# Patient Record
Sex: FEMALE | Employment: FULL TIME
[De-identification: names, ages, dates, MRNs, and addresses within clinical notes are randomized per-mention and may not be internally consistent; named-entity substitution may affect disease eponyms.]

---

## 2017-09-10 ENCOUNTER — HEALTH MAINTENANCE LETTER (OUTPATIENT)
Age: 47
End: 2017-09-10

## 2019-11-08 ENCOUNTER — HEALTH MAINTENANCE LETTER (OUTPATIENT)
Age: 49
End: 2019-11-08

## 2020-02-23 ENCOUNTER — HEALTH MAINTENANCE LETTER (OUTPATIENT)
Age: 50
End: 2020-02-23

## 2020-09-04 ENCOUNTER — THERAPY VISIT (OUTPATIENT)
Dept: PHYSICAL THERAPY | Facility: CLINIC | Age: 50
End: 2020-09-04
Payer: COMMERCIAL

## 2020-09-04 DIAGNOSIS — M25.571 PAIN IN JOINT INVOLVING ANKLE AND FOOT, RIGHT: ICD-10-CM

## 2020-09-04 DIAGNOSIS — M25.371 ANKLE INSTABILITY, RIGHT: ICD-10-CM

## 2020-09-04 PROCEDURE — 97110 THERAPEUTIC EXERCISES: CPT | Mod: GP | Performed by: PHYSICAL THERAPIST

## 2020-09-04 PROCEDURE — 97161 PT EVAL LOW COMPLEX 20 MIN: CPT | Mod: GP | Performed by: PHYSICAL THERAPIST

## 2020-09-04 NOTE — PROGRESS NOTES
"[facility] Initial Evaluation  Subjective:  The history is provided by the patient. No  was used.   Patient Health History  Dari Sanchez being seen for R ankle pain/instability.     Date of Onset: 8/18/20 MD order for PT.   Problem occurred: reports injurying her right ankle when missing a step at home. She reports fracturing her \"mid tarsals and spraining ligaments\" on 5/18/20. She used a boot for 12 weeks , off of boot for 2 weeks. She c/o a sharp pain in the inside and outside of her foot when walking longer distances however it is much improved since the injury.   Pain is reported as 3/10 on pain scale.  General health as reported by patient is excellent.  Pertinent medical history includes: osteoarthritis.   Red flags:  None as reported by patient.  Medical allergies: latex.   Surgeries include:  Other and orthopedic surgery (gall bladder, bunionectomy B 2012,2010, R knee scope 2008 ).    Current medications:  Anti-inflammatory.    Current occupation is  inventory .   Primary job tasks include:  Computer work.                  Therapist Generated HPI Evaluation         Type of problem:  Left ankle and left foot.    This is a new condition.  Condition occurred with:  A fall/slip.    Patient reports pain:  Lateral and medial.        Associated symptoms:  Loss of motion/stiffness. Symptoms are exacerbated by walking, ascending stairs and descending stairs  and relieved by ice and NSAID's.                              Objective:    Gait:    Gait Type:  Antalgic   Assistive Devices:  None  Deviations:  Ankle:  Push off decr R and heel strike decr RGeneral Deviations:  Marry decr    Flexibility/Screens:       Lower Extremity:      Decreased right lower extremity flexibility:  Gastroc          Ankle/Foot Evaluation  ROM:    AROM:    Dorsiflexion:  Left:   8  Right:   5  Plantarflexion:  Left:  64    Right:  54  Inversion:  Left:  30     Right:  " 12  Eversion:  15     Right:  10      PROM:    Dorsiflexion:  Left:    10     Right:   8   Plantarflexion:  Left:    65     Right:  58  Inversion:  Left:      30     Right:   20  Eversion: Left:   15     Right:  14          Strength is not assessed.      PALPATION: normal    EDEMA:     Right ankle edema present at:  lateral; anterior and forefoot                                                            General     ROS    Assessment/Plan:    Patient is a 49 year old female with right side ankle complaints.    Patient has the following significant findings with corresponding treatment plan.                Diagnosis 1: ankle instability  Pain -  hot/cold therapy, manual therapy, self management, education and home program  Decreased ROM/flexibility - manual therapy, therapeutic exercise and home program  Decreased joint mobility - manual therapy, therapeutic exercise and home program  Decreased strength - therapeutic exercise, therapeutic activities and home program  Decreased proprioception - neuro re-education, therapeutic activities and home program  Impaired gait - gait training and home program  Impaired muscle performance - neuro re-education and home program  Decreased function - therapeutic activities and home program    Therapy Evaluation Codes:   1) History comprised of:   Personal factors that impact the plan of care:      None.    Comorbidity factors that impact the plan of care are:      None.     Medications impacting care: None.  2) Examination of Body Systems comprised of:   Body structures and functions that impact the plan of care:      Ankle.   Activity limitations that impact the plan of care are:      Jumping, Running, Sports, Stairs, Standing and Walking.  3) Clinical presentation characteristics are:   Stable/Uncomplicated.  4) Decision-Making    Low complexity using standardized patient assessment instrument and/or measureable assessment of functional outcome.  Cumulative Therapy Evaluation  is: Low complexity.    Previous and current functional limitations:  (See Goal Flow Sheet for this information)    Short term and Long term goals: (See Goal Flow Sheet for this information)     Communication ability:  Patient appears to be able to clearly communicate and understand verbal and written communication and follow directions correctly.  Treatment Explanation - The following has been discussed with the patient:   RX ordered/plan of care  Anticipated outcomes  Possible risks and side effects  This patient would benefit from PT intervention to resume normal activities.   Rehab potential is good.    Frequency:  1 X week, once daily  Duration:  for 8 weeks  Discharge Plan:  Achieve all LTG.  Independent in home treatment program.  Reach maximal therapeutic benefit.    Please refer to the daily flowsheet for treatment today, total treatment time and time spent performing 1:1 timed codes.

## 2020-09-04 NOTE — LETTER
"[facility] PHYSICAL THERAPY  [address] [address]  [facility] [place] [id number] [phone]    2020    Re: Dari Sanchez   :   1970  MRN:  [id number]   REFERRING PHYSICIAN:   Ankit Shaffer    [facility]    Date of Initial Evaluation:  2020  Visits:  Rxs Used: 1  Reason for Referral:     Pain in joint involving ankle and foot, right  Ankle instability, right    EVALUATION SUMMARY    [facility]  Subjective:  The history is provided by the patient. No  was used.     Patient Health History  Dari Sanchez being seen for R ankle pain/instability.   Date of Onset: 20 MD order for PT.   Problem occurred: reports injurying her right ankle when missing a step at home. She reports fracturing her \"mid tarsals and spraining ligaments\" on 20. She used a boot for 12 weeks , off of boot for 2 weeks. She c/o a sharp pain in the inside and outside of her foot when walking longer distances however it is much improved since the injury.   Pain is reported as 3/10 on pain scale.  General health as reported by patient is excellent.  Pertinent medical history includes: osteoarthritis.   Red flags:  None as reported by patient.  Medical allergies: latex.   Surgeries include:  Other and orthopedic surgery (gall bladder, bunionectomy B ,, R knee scope  ).    Current medications:  Anti-inflammatory.    Current occupation is senior director inventory .   Primary job tasks include:  Computer work.                Therapist Generated HPI Evaluation  Type of problem:  Left ankle and left foot.  This is a new condition.  Condition occurred with:  A fall/slip.  Patient reports pain:  Lateral and medial.  Associated symptoms:  Loss of motion/stiffness. Symptoms are exacerbated by walking, ascending stairs and descending stairs  and relieved by ice " and NSAID's.        Dariarun Woodallen   :   1970    Objective:    Gait:    Gait Type:  Antalgic   Assistive Devices:  None  Deviations:  Ankle:  Push off decr R and heel strike decr RGeneral Deviations:  Marry decr    Flexibility/Screens:     Lower Extremity:    Decreased right lower extremity flexibility:  Gastroc    Ankle/Foot Evaluation  ROM:    AROM:    Dorsiflexion:  Left:   8  Right:   5  Plantarflexion:  Left:  64    Right:  54  Inversion:  Left:  30     Right:  12  Eversion:  15     Right:  10    PROM:    Dorsiflexion:  Left:    10     Right:   8   Plantarflexion:  Left:    65     Right:  58  Inversion:  Left:      30     Right:   20  Eversion: Left:   15     Right:  14  Strength is not assessed.    PALPATION: normal    EDEMA:     Right ankle edema present at:  lateral; anterior and forefoot    General     ROS    Assessment/Plan:    Patient is a 49 year old female with right side ankle complaints.    Patient has the following significant findings with corresponding treatment plan.                Diagnosis 1: ankle instability  Pain -  hot/cold therapy, manual therapy, self management, education and home program  Decreased ROM/flexibility - manual therapy, therapeutic exercise and home program  Decreased joint mobility - manual therapy, therapeutic exercise and home program  Decreased strength - therapeutic exercise, therapeutic activities and home program  Decreased proprioception - neuro re-education, therapeutic activities and home program  Impaired gait - gait training and home program  Impaired muscle performance - neuro re-education and home program  Decreased function - therapeutic activities and home program    Dari Sanchez   :   1970    Therapy Evaluation Codes:   1) History comprised of:   Personal factors that impact the plan of care:      None.    Comorbidity factors that impact the plan of care are:      None.     Medications impacting care: None.  2) Examination of Body  Systems comprised of:   Body structures and functions that impact the plan of care:      Ankle.   Activity limitations that impact the plan of care are:      Jumping, Running, Sports, Stairs, Standing and Walking.  3) Clinical presentation characteristics are:   Stable/Uncomplicated.  4) Decision-Making    Low complexity using standardized patient assessment instrument and/or measureable assessment of functional outcome.  Cumulative Therapy Evaluation is: Low complexity.    Previous and current functional limitations:  (See Goal Flow Sheet for this information)    Short term and Long term goals: (See Goal Flow Sheet for this information)   Communication ability:  Patient appears to be able to clearly communicate and understand verbal and written communication and follow directions correctly.  Treatment Explanation - The following has been discussed with the patient:   RX ordered/plan of care  Anticipated outcomes  Possible risks and side effects  This patient would benefit from PT intervention to resume normal activities.   Rehab potential is good.  Frequency:  1 X week, once daily  Duration:  for 8 weeks  Discharge Plan:  Achieve all LTG.  Independent in home treatment program.  Reach maximal therapeutic benefit.    Thank you for your referral.    INQUIRIES  Therapist: Yue Burch, PT  [facility] PHYSICAL THERAPY  [address] [place] [address]. [address]  [facility] [address]  Phone: [phone]  Fax: [phone]

## 2020-09-11 ENCOUNTER — THERAPY VISIT (OUTPATIENT)
Dept: PHYSICAL THERAPY | Facility: CLINIC | Age: 50
End: 2020-09-11
Payer: COMMERCIAL

## 2020-09-11 DIAGNOSIS — M25.371 ANKLE INSTABILITY, RIGHT: ICD-10-CM

## 2020-09-11 DIAGNOSIS — M25.571 PAIN IN JOINT INVOLVING ANKLE AND FOOT, RIGHT: ICD-10-CM

## 2020-09-11 PROCEDURE — 97110 THERAPEUTIC EXERCISES: CPT | Mod: GP | Performed by: PHYSICAL THERAPIST

## 2020-09-11 PROCEDURE — 97140 MANUAL THERAPY 1/> REGIONS: CPT | Mod: GP | Performed by: PHYSICAL THERAPIST

## 2020-09-25 ENCOUNTER — THERAPY VISIT (OUTPATIENT)
Dept: PHYSICAL THERAPY | Facility: CLINIC | Age: 50
End: 2020-09-25
Payer: COMMERCIAL

## 2020-09-25 DIAGNOSIS — M54.12 CERVICAL RADICULOPATHY: ICD-10-CM

## 2020-09-25 PROCEDURE — 97161 PT EVAL LOW COMPLEX 20 MIN: CPT | Mod: GP | Performed by: PHYSICAL THERAPIST

## 2020-09-25 PROCEDURE — 97110 THERAPEUTIC EXERCISES: CPT | Mod: GP | Performed by: PHYSICAL THERAPIST

## 2020-09-25 NOTE — PROGRESS NOTES
[facility] Initial Evaluation  Subjective:  Sera reports that she slept wrong for a night (about 4 weeks ago) and she woke up with a very stiff neck and feels pain low on the R side of the neck at base of shoulder. Working out with TRX 3 days in a row after her initial pain made things worse and she has now stopped that.  Reaching low with arm extended creates numbness in R arm (tying shoes, etc) and raising arm overhead will provide relief.  Lengthy computer work produces increased R upper arm, elbow, forearm and hand symptoms as well.      The history is provided by the patient. No  was used.   Therapist Generated HPI Evaluation         Type of problem:  Cervical spine.    This is a new condition.  Occurance: wrong sleeping position.  Where condition occurred: at home.  Patient reports pain:  Lower cervical spine and cervical right side.  Pain is described as shooting and aching (tingling) and is constant.  Pain radiates to:  Shoulder right, upper arm right, elbow right, lower arm right and hand right. Pain timing: activity-dependent.  Since onset symptoms are unchanged.  Associated symptoms:  Loss of motion/stiffness, numbness, tingling and headache (2 HA/week). Symptoms are exacerbated by driving, looking up or down, rotating head, lifting, carrying, certain positions and sitting (computer work)  and relieved by NSAID's and ice (raise arm overhead).      Restrictions due to condition include:  Working in normal job without restrictions.  Barriers include:  None as reported by patient.    Patient Health History  Dari Sanchez being seen for neck and R arm pain.     Date of Onset: 1 month ago.   Problem occurred: sleeping   Pain is reported as 5/10 on pain scale.  General health as reported by patient is excellent.  Pertinent medical history includes: osteoarthritis.   Red flags:  None as reported by patient.  Medical allergies: latex.   Surgeries include:  Orthopedic  surgery (gall bladder, B bunionectomy, R knee scope).    Current medications:  Anti-inflammatory.    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   Primary job tasks include:  Computer work and prolonged sitting.                                    Objective:  Standing Alignment:    Cervical/Thoracic:  Forward head and thoracic kyphosis increased  Shoulder/UE:  Rounded shoulders                                            ROM:          :  Dominance: Right   Left: 25 lbs      Right: 29 lbs                                      Flex Cervical Evaluation    Posture:  Sitting: poor  Standing: poor  Protruding Head: yes  Wry Neck: no  Correction of Posture: no effect    Movement Loss:  Protrusion (PRO): nil  Flexion (Flex): pain and min  Retraction (RET): min and pain  Extension (EXT): min and pain  Lateral Flexion Right (LF R): min and pain  Lateral Flexion Left (LF L): nil  Rotation Right (ROT R): min and pain  Rotation Left (ROT L): nil  Test Movements:      RET: During: increases  After: no worse  Mechanical Response: no effect  Repeat RET: During: no effect  After: no effect  Mechanical Response: no effect  RET EXT: During: increases and centralizing  After: no effect  Mechanical Response: no effect  Repeat RET EXT: During: increases and centralizing  After: no worse  Mechanical Response: IncROM                        Conclusion: derangement  Principle of Treatment:  Posture Correction: posture correction in sitting, use of lumbar roll for computer work    Extension: rep ret/ext every 2-3 hours, reduce to rep ret with self OP only if R arm remains worse from ret/ext    Other: pt education regarding spine postures, rationale for posture correction, chosen HEP and expectations for changes in pain (centralization vs peripheralization), answered pt questions                                     Musculoskeletal:        Arms:        ROS    Assessment/Plan:    Patient is a 50 year old female with  cervical and right arm complaints.    Patient has the following significant findings with corresponding treatment plan.                Diagnosis 1:  R cervical radiculopathy  Pain -  hot/cold therapy, manual therapy, self management, education, directional preference exercise and home program  Decreased ROM/flexibility - manual therapy, therapeutic exercise and home program  Decreased joint mobility - manual therapy, therapeutic exercise and home program  Impaired muscle performance - neuro re-education and home program  Decreased function - therapeutic activities and home program  Impaired posture - neuro re-education and home program    Therapy Evaluation Codes:   1) History comprised of:   Personal factors that impact the plan of care:      None.    Comorbidity factors that impact the plan of care are:      None.     Medications impacting care: None.  2) Examination of Body Systems comprised of:   Body structures and functions that impact the plan of care:      Cervical spine.   Activity limitations that impact the plan of care are:      Bathing, Driving, Lifting, Reading/Computer work, Sitting, Working, Sleeping, Laying down and reaching.  3) Clinical presentation characteristics are:   Stable/Uncomplicated.  4) Decision-Making    Low complexity using standardized patient assessment instrument and/or measureable assessment of functional outcome.  Cumulative Therapy Evaluation is: Low complexity.    Previous and current functional limitations:  (See Goal Flow Sheet for this information)    Short term and Long term goals: (See Goal Flow Sheet for this information)     Communication ability:  Patient appears to be able to clearly communicate and understand verbal and written communication and follow directions correctly.  Treatment Explanation - The following has been discussed with the patient:   RX ordered/plan of care  Anticipated outcomes  Possible risks and side effects  This patient would benefit from PT  intervention to resume normal activities.   Rehab potential is good.    Frequency:  1 X week, once daily  Duration:  for 8 weeks  Discharge Plan:  Achieve all LTG.  Independent in home treatment program.  Reach maximal therapeutic benefit.    Please refer to the daily flowsheet for treatment today, total treatment time and time spent performing 1:1 timed codes.

## 2020-10-02 ENCOUNTER — THERAPY VISIT (OUTPATIENT)
Dept: PHYSICAL THERAPY | Facility: CLINIC | Age: 50
End: 2020-10-02
Payer: COMMERCIAL

## 2020-10-02 DIAGNOSIS — M25.371 ANKLE INSTABILITY, RIGHT: ICD-10-CM

## 2020-10-02 DIAGNOSIS — M54.12 CERVICAL RADICULOPATHY: ICD-10-CM

## 2020-10-02 DIAGNOSIS — M25.571 PAIN IN JOINT INVOLVING ANKLE AND FOOT, RIGHT: ICD-10-CM

## 2020-10-02 PROCEDURE — 97035 APP MDLTY 1+ULTRASOUND EA 15: CPT | Mod: GP | Performed by: PHYSICAL THERAPIST

## 2020-10-02 PROCEDURE — 97110 THERAPEUTIC EXERCISES: CPT | Mod: GP | Performed by: PHYSICAL THERAPIST

## 2020-10-02 PROCEDURE — 97140 MANUAL THERAPY 1/> REGIONS: CPT | Mod: GP | Performed by: PHYSICAL THERAPIST

## 2020-10-09 ENCOUNTER — THERAPY VISIT (OUTPATIENT)
Dept: PHYSICAL THERAPY | Facility: CLINIC | Age: 50
End: 2020-10-09
Payer: COMMERCIAL

## 2020-10-09 DIAGNOSIS — M54.12 CERVICAL RADICULOPATHY: ICD-10-CM

## 2020-10-09 PROCEDURE — 97140 MANUAL THERAPY 1/> REGIONS: CPT | Mod: GP | Performed by: PHYSICAL THERAPIST

## 2020-10-09 PROCEDURE — 97110 THERAPEUTIC EXERCISES: CPT | Mod: GP | Performed by: PHYSICAL THERAPIST

## 2020-10-30 ENCOUNTER — THERAPY VISIT (OUTPATIENT)
Dept: PHYSICAL THERAPY | Facility: CLINIC | Age: 50
End: 2020-10-30
Payer: COMMERCIAL

## 2020-10-30 DIAGNOSIS — M25.571 PAIN IN JOINT INVOLVING ANKLE AND FOOT, RIGHT: ICD-10-CM

## 2020-10-30 DIAGNOSIS — M25.371 ANKLE INSTABILITY, RIGHT: ICD-10-CM

## 2020-10-30 DIAGNOSIS — M54.12 CERVICAL RADICULOPATHY: ICD-10-CM

## 2020-10-30 PROCEDURE — 97530 THERAPEUTIC ACTIVITIES: CPT | Mod: GP | Performed by: PHYSICAL THERAPIST

## 2020-10-30 PROCEDURE — 97140 MANUAL THERAPY 1/> REGIONS: CPT | Mod: GP | Performed by: PHYSICAL THERAPIST

## 2020-10-30 PROCEDURE — 97012 MECHANICAL TRACTION THERAPY: CPT | Mod: GP | Performed by: PHYSICAL THERAPIST

## 2020-10-30 PROCEDURE — 97110 THERAPEUTIC EXERCISES: CPT | Mod: GP | Performed by: PHYSICAL THERAPIST

## 2020-11-06 ENCOUNTER — THERAPY VISIT (OUTPATIENT)
Dept: PHYSICAL THERAPY | Facility: CLINIC | Age: 50
End: 2020-11-06
Payer: COMMERCIAL

## 2020-11-06 DIAGNOSIS — M54.12 CERVICAL RADICULOPATHY: ICD-10-CM

## 2020-11-06 PROCEDURE — 97110 THERAPEUTIC EXERCISES: CPT | Mod: GP | Performed by: PHYSICAL THERAPIST

## 2020-11-06 PROCEDURE — 97530 THERAPEUTIC ACTIVITIES: CPT | Mod: GP | Performed by: PHYSICAL THERAPIST

## 2020-11-06 PROCEDURE — 97012 MECHANICAL TRACTION THERAPY: CPT | Mod: GP | Performed by: PHYSICAL THERAPIST

## 2020-11-06 NOTE — PROGRESS NOTES
Subjective:  HPI  Physical Exam                    Objective:  System    Physical Exam    General     ROS    Assessment/Plan:    SUBJECTIVE  Subjective changes as noted by pt:  Sera reports that she has been miserable the last few days. Cannot get comfortable to sleep. The R elbow is quite sore.        Current pain level: 7/10     Changes in function:  None     Adverse reaction to treatment or activity:  None    OBJECTIVE  Changes in objective findings:  Yes, See physical exam section and/or daily flowsheet for response to repeated movements.           ASSESSMENT  Dari continues to require intervention to meet STG and LTG's: PT  No change of symptoms has been noted.  Response to therapy has shown lack of progress in  pain level  Progress made towards STG/LTG?  Yes (See Goal flowsheet attached for updates on achievement of STG and LTG)    PLAN  Continue current treatment plan until patient demonstrates readiness to progress to higher level exercises.    PTA/ATC plan:  N/A    Please refer to the daily flowsheet for treatment today, total treatment time and time spent performing 1:1 timed codes.

## 2020-11-13 ENCOUNTER — THERAPY VISIT (OUTPATIENT)
Dept: PHYSICAL THERAPY | Facility: CLINIC | Age: 50
End: 2020-11-13
Payer: COMMERCIAL

## 2020-11-13 DIAGNOSIS — M54.12 CERVICAL RADICULOPATHY: ICD-10-CM

## 2020-11-13 PROCEDURE — 97140 MANUAL THERAPY 1/> REGIONS: CPT | Mod: GP | Performed by: PHYSICAL THERAPIST

## 2020-11-13 PROCEDURE — 97012 MECHANICAL TRACTION THERAPY: CPT | Mod: GP | Performed by: PHYSICAL THERAPIST

## 2020-11-20 ENCOUNTER — THERAPY VISIT (OUTPATIENT)
Dept: PHYSICAL THERAPY | Facility: CLINIC | Age: 50
End: 2020-11-20
Payer: COMMERCIAL

## 2020-11-20 DIAGNOSIS — M54.12 CERVICAL RADICULOPATHY: ICD-10-CM

## 2020-11-20 PROCEDURE — 97012 MECHANICAL TRACTION THERAPY: CPT | Mod: GP

## 2020-11-20 PROCEDURE — 97530 THERAPEUTIC ACTIVITIES: CPT | Mod: GP

## 2020-11-27 ENCOUNTER — THERAPY VISIT (OUTPATIENT)
Dept: PHYSICAL THERAPY | Facility: CLINIC | Age: 50
End: 2020-11-27
Payer: COMMERCIAL

## 2020-11-27 DIAGNOSIS — M54.12 CERVICAL RADICULOPATHY: ICD-10-CM

## 2020-11-27 PROCEDURE — 97140 MANUAL THERAPY 1/> REGIONS: CPT | Mod: GP | Performed by: PHYSICAL THERAPIST

## 2020-11-27 PROCEDURE — 97012 MECHANICAL TRACTION THERAPY: CPT | Mod: GP | Performed by: PHYSICAL THERAPIST

## 2020-11-27 NOTE — PROGRESS NOTES
Subjective:  HPI  Physical Exam                    Objective:  System    Physical Exam    General     ROS    Assessment/Plan:    PROGRESS  REPORT    Progress reporting period is from 9/25/20 to 11/27/20.       SUBJECTIVE  Subjective: Sera reports that she is definitely getting better., but it still goes numb from time to time, mariela with reaching down.  Has only 2 bad days/week instead of 7 bad days.  Still babies it because she can tell its still there.  Sleeping with pillow rolled under her neck and lying on her back is best.  Would like to continue PT. Wants to purchase a lumbar roll and cervical pillow.     Current pain level is 2/10  .     Initial Pain level: 4/10 R UT, 5/10 R elbow.   Changes in function:  Yes (See Goal flowsheet attached for changes in current functional level)  Adverse reaction to treatment or activity: None    OBJECTIVE  Changes noted in objective findings:  Yes, please see below.   Objective: CROM: flex min loss, ext mod loss + R neck pain, ret mod loss + pinch (ok when donw with felexion bias), B SB min-mod loss, L rot full, R rot min loss.  R arm numbness with extension and retraction in neutral.  No loss  strength.  At this time, Sera is geting relief between session through posture corrections, retraction movements with flexion bias and scapular strengthening.  She continues to require PT for progressive pain relief, reducation of radicaulr symptoms and return to previous level of function.       ASSESSMENT/PLAN  Updated problem list and treatment plan: Diagnosis 1:  R cervical radiculopathy  Pain -  hot/cold therapy, mechanical traction, manual therapy, self management, education, directional preference exercise and home program  Decreased ROM/flexibility - manual therapy, therapeutic exercise and home program  Decreased joint mobility - manual therapy, therapeutic exercise and home program  Impaired muscle performance - neuro re-education and home program  Decreased function -  therapeutic activities and home program  Impaired posture - neuro re-education and home program  STG/LTGs have been met or progress has been made towards goals:  Yes (See Goal flow sheet completed today.)  Assessment of Progress: The patient's condition is improving.  Patient is meeting short term goals and is progressing towards long term goals.  Self Management Plans:  Patient has been instructed in a home treatment program.  Patient  has been instructed in self management of symptoms.  I have re-evaluated this patient and find that the nature, scope, duration and intensity of the therapy is appropriate for the medical condition of the patient.  Dari continues to require the following intervention to meet STG and LTG's:  PT    Recommendations:  This patient would benefit from continued therapy.     Frequency:  1-2 X week, once daily  Duration:  for 8 visits        Please refer to the daily flowsheet for treatment today, total treatment time and time spent performing 1:1 timed codes.

## 2020-12-03 ENCOUNTER — THERAPY VISIT (OUTPATIENT)
Dept: PHYSICAL THERAPY | Facility: CLINIC | Age: 50
End: 2020-12-03
Payer: COMMERCIAL

## 2020-12-03 DIAGNOSIS — M54.12 CERVICAL RADICULOPATHY: ICD-10-CM

## 2020-12-03 PROCEDURE — 97012 MECHANICAL TRACTION THERAPY: CPT | Mod: GP | Performed by: PHYSICAL THERAPIST

## 2020-12-03 PROCEDURE — 97140 MANUAL THERAPY 1/> REGIONS: CPT | Mod: GP | Performed by: PHYSICAL THERAPIST

## 2020-12-03 PROCEDURE — 97110 THERAPEUTIC EXERCISES: CPT | Mod: GP | Performed by: PHYSICAL THERAPIST

## 2020-12-03 NOTE — PROGRESS NOTES
Subjective:  HPI  Physical Exam                    Objective:  System    Physical Exam    General     ROS    Assessment/Plan:    SUBJECTIVE  Subjective changes as noted by pt:  Getting better. Fewer days during the week where the pain is present, but not throbbing, more achey. Notices R arm numbness with certain activities. Overall about 50% improved.        Current pain level: 3/10     Changes in function:  Yes (See Goal flowsheet attached for changes in current functional level)     Adverse reaction to treatment or activity:  None    OBJECTIVE  Changes in objective findings:  Yes, See physical exam section and/or daily flowsheet for response to repeated movements.           ASSESSMENT  Dari continues to require intervention to meet STG and LTG's: PT  Patient's symptoms are resolving.  Patient is progressing as expected.  Response to therapy has shown an improvement in  pain level, ROM  and function  Progress made towards STG/LTG?  Yes (See Goal flowsheet attached for updates on achievement of STG and LTG)    PLAN  Continue current treatment plan until patient demonstrates readiness to progress to higher level exercises.    PTA/ATC plan:  N/A    Please refer to the daily flowsheet for treatment today, total treatment time and time spent performing 1:1 timed codes.

## 2020-12-06 ENCOUNTER — HEALTH MAINTENANCE LETTER (OUTPATIENT)
Age: 50
End: 2020-12-06

## 2021-01-08 ENCOUNTER — THERAPY VISIT (OUTPATIENT)
Dept: PHYSICAL THERAPY | Facility: CLINIC | Age: 51
End: 2021-01-08
Payer: COMMERCIAL

## 2021-01-08 DIAGNOSIS — M54.12 CERVICAL RADICULOPATHY: ICD-10-CM

## 2021-01-08 PROCEDURE — 97110 THERAPEUTIC EXERCISES: CPT | Mod: GP | Performed by: PHYSICAL THERAPIST

## 2021-01-08 PROCEDURE — 97140 MANUAL THERAPY 1/> REGIONS: CPT | Mod: GP | Performed by: PHYSICAL THERAPIST

## 2021-01-08 PROCEDURE — 97530 THERAPEUTIC ACTIVITIES: CPT | Mod: GP | Performed by: PHYSICAL THERAPIST

## 2021-01-08 NOTE — PROGRESS NOTES
Subjective:  HPI  Physical Exam                    Objective:  System              Cervical/Thoracic Evaluation              Cervical Palpation:      Tenderness present at Right:    Upper Trap                                                  Flex Cervical Evaluation      Movement Loss:    Flexion (Flex): nil  Retraction (RET): nil  Extension (EXT): min  Lateral Flexion Right (LF R): nil  Lateral Flexion Left (LF L): nil  Rotation Right (ROT R): nil  Rotation Left (ROT L): nil                                                 ROS    Assessment/Plan:    PROGRESS  REPORT    Progress reporting period is from 9/25/20 to 1/8/21.       SUBJECTIVE  Subjective changes noted by patient:  Sera returns to PT after a month. Had to miss a handful appointments, including due to weather. Had gotten to the point that she felt like she was almost normal. Flew to [place] and back this last weekend and notes return to the R scapular pain and soreness in the R elbow. Feels it may be related to posture while at the airport. Demonstrates that she has been performing cervical retraction, rotation and sidebending 10 reps 4x/day. Started reducing Gabapentin.          Current pain level is 2/10  .     Previous pain level was  5/10  .   Changes in function:  Yes (See Goal flowsheet attached for changes in current functional level)  Adverse reaction to treatment or activity: None    OBJECTIVE  Changes noted in objective findings:  Yes, See physical exam section and/or daily flowsheet for response to repeated movements.           ASSESSMENT/PLAN  Updated problem list and treatment plan: Diagnosis 1:  R cervical radiculopathy    Pain -  manual therapy, self management, education, directional preference exercise and home program  Decreased ROM/flexibility - manual therapy, therapeutic exercise, therapeutic activity and home program  Decreased joint mobility - manual therapy, therapeutic exercise, therapeutic activity and home program  Decreased  strength - therapeutic exercise, therapeutic activities and home program  Inflammation - self management/home program  Decreased function - therapeutic activities and home program  Impaired posture - neuro re-education, therapeutic activities and home program  STG/LTGs have been met or progress has been made towards goals:  Yes (See Goal flow sheet completed today.)  Assessment of Progress: The patient's condition has exacerbated.  Self Management Plans:  Patient has been instructed in a home treatment program.  Patient  has been instructed in self management of symptoms.  I have re-evaluated this patient and find that the nature, scope, duration and intensity of the therapy is appropriate for the medical condition of the patient.  Dari continues to require the following intervention to meet STG and LTG's:  PT    Recommendations:  This patient would benefit from continued therapy.     Frequency:  1 X week, once daily  Duration:  for 3 weeks        Please refer to the daily flowsheet for treatment today, total treatment time and time spent performing 1:1 timed codes.

## 2021-01-21 PROBLEM — M54.12 CERVICAL RADICULOPATHY: Status: RESOLVED | Noted: 2020-09-25 | Resolved: 2021-01-21

## 2021-01-21 NOTE — PROGRESS NOTES
Patient missed last scheduled appt on 1/20/21. Spoke on phone, Sera had forgottent about the appt but stated that she was feeling good and managing symptoms. Discussed plan for full reduction and maintenance timeframe. Call or return to clinic with any flareups.   Please refer to the progress note and goal flowsheet completed on 01/08/21 for discharge information.

## 2021-02-20 ENCOUNTER — HEALTH MAINTENANCE LETTER (OUTPATIENT)
Age: 51
End: 2021-02-20

## 2021-07-25 PROBLEM — M25.371 ANKLE INSTABILITY, RIGHT: Status: RESOLVED | Noted: 2020-09-04 | Resolved: 2021-07-25

## 2021-07-25 PROBLEM — M25.571 PAIN IN JOINT INVOLVING ANKLE AND FOOT, RIGHT: Status: RESOLVED | Noted: 2020-09-04 | Resolved: 2021-07-25

## 2021-09-25 ENCOUNTER — HEALTH MAINTENANCE LETTER (OUTPATIENT)
Age: 51
End: 2021-09-25

## 2022-01-15 ENCOUNTER — HEALTH MAINTENANCE LETTER (OUTPATIENT)
Age: 52
End: 2022-01-15

## 2022-03-12 ENCOUNTER — HEALTH MAINTENANCE LETTER (OUTPATIENT)
Age: 52
End: 2022-03-12

## 2022-11-09 ENCOUNTER — OFFICE VISIT (OUTPATIENT)
Dept: VASCULAR SURGERY | Facility: CLINIC | Age: 52
End: 2022-11-09
Payer: COMMERCIAL

## 2022-11-09 DIAGNOSIS — I83.93 SPIDER VEINS OF BOTH LOWER EXTREMITIES: Primary | ICD-10-CM

## 2022-11-09 PROCEDURE — 99207 PR NO CHARGE NURSE ONLY: CPT

## 2022-11-09 PROCEDURE — A6533 GC STOCKING THIGHLNGTH 18-30: HCPCS

## 2022-11-09 PROCEDURE — 99202 OFFICE O/P NEW SF 15 MIN: CPT | Performed by: SPECIALIST

## 2022-11-09 PROCEDURE — 36468 NJX SCLRSNT SPIDER VEINS: CPT | Performed by: SPECIALIST

## 2022-11-09 PROCEDURE — S9999 SALES TAX: HCPCS | Performed by: SPECIALIST

## 2022-11-09 RX ORDER — LOSARTAN POTASSIUM AND HYDROCHLOROTHIAZIDE 12.5; 5 MG/1; MG/1
1 TABLET ORAL DAILY
COMMUNITY
Start: 2020-12-03

## 2022-11-09 NOTE — PROGRESS NOTES
Consult requested by Dr. Chapman    Reason for consultation: Spider veins    HPI:  Patient is a 52-year-old white female with a longstanding history of bilateral lower extremity spider veins.  She was last treated with sclerotherapy in 2012.  She knows some new ones started to form around her ankle and she is thinking she like to have them treated.  She denies any leg trauma, DVT, superficial Vitas or varicose veins.  She now presents to me for evaluation of her spider veins.    No past medical history on file.    Past Surgical History:   Procedure Laterality Date     ARTHROSCOPY KNEE RT/LT  11/14/07     CHOLECYSTECTOMY, LAPOROSCOPIC  April 1, 2004    Cholecystectomy, Laparoscopic     Current Outpatient Medications   Medication     ADVIL 200 MG OR CAPS     losartan-hydrochlorothiazide (HYZAAR) 50-12.5 MG tablet     SUDAFED OR     VICODIN 5-500 MG OR TABS     No current facility-administered medications for this visit.        Allergies   Allergen Reactions     Doxycycline Hyclate [Doxycycline] Anaphylaxis     Latex Rash     Social History     Social History Narrative     Not on file     Family History   Problem Relation Age of Onset     Cancer Maternal Grandfather         luekemia     Diabetes Paternal Grandmother      Cancer Paternal Grandfather          with black lung      ROS: 10 point ROS neg other than the symptoms noted above in the HPI.    PE:  B/P: Data Unavailable, T: Data Unavailable, P: Data Unavailable, R: Data Unavailable  General: well developed, well nourished WF who appears their stated age  HEENT: NC/AT, EOMI, (-)icterus, (-)injection  Neck: Supple, No JVD  Chest: CTA  Heart: S1, S2, (-)m/r/g  Abd: Soft, non tender, non distended, non tender, no masses  Ext; Warm, no edema, no varicose veins, bilateral ankle spider veins  Psych: AAOx3  Neuro: No focal deficits    Impression/plan:  There is a 52-year-old lady with bilateral ankle spider veins.  She is a CEAP 1.  I discussed the cosmetic  nature of them and the patient expressed understanding.  After discussion with the patient the plan at this time is for half session of sclerotherapy.  That will be under separate dictation today.  The procedure, risks, benefits, and alternatives were discussed and the patient agrees to proceed.      Garcia Llanes MD, FACS

## 2022-11-09 NOTE — LETTER
11/9/2022         RE: Dari Sanchez  [address]  [place] [address]        Dear Colleague,    Thank you for referring your patient, Dari Sanchez, to the [facility] [place]. Please see a copy of my visit note below.        [facility] Procedure Note    Dari Sanchez  November 9, 2022    Dari Sanchez is a 52 year old year old female. She presents for Sclerotherapy  .    There were no vitals taken for this visit.    Flowsheet Data 11/9/2022   Side: Bilateral       Sclerotherapy    Date/Time: 11/9/2022 1:55 PM  Performed by: Garcia Llanes MD  Authorized by: Garcia Llanes MD     Time out: Immediately prior to the procedure a time out was called    Preparation: Patient was prepped and draped in usual sterile fashion    Type:  Cosmetic  Session:  Limited  Procedure side:  Bilateral  Solution/Amount:  .5 POLIDOCANOL  Syringes:  3  Patient tolerance:  Patient tolerated the procedure well with no immediate complications        Garcia Llanes MD,FACS      Again, thank you for allowing me to participate in the care of your patient.        Sincerely,        Garcia Llanes MD

## 2022-11-09 NOTE — LETTER
11/9/2022         RE: Dari Sanchez  [address] [address]        Dear Colleague,    Thank you for referring your patient, Dari Sanchez, to the [facility] [place]. Please see a copy of my visit note below.    Consult requested by Dr. Chapman    Reason for consultation: Spider veins    HPI:  Patient is a 52-year-old white female with a longstanding history of bilateral lower extremity spider veins.  She was last treated with sclerotherapy in 2012.  She knows some new ones started to form around her ankle and she is thinking she like to have them treated.  She denies any leg trauma, DVT, superficial Vitas or varicose veins.  She now presents to me for evaluation of her spider veins.    No past medical history on file.    Past Surgical History:   Procedure Laterality Date     ARTHROSCOPY KNEE RT/LT  11/14/07     CHOLECYSTECTOMY, LAPOROSCOPIC  April 1, 2004    Cholecystectomy, Laparoscopic     Current Outpatient Medications   Medication     ADVIL 200 MG OR CAPS     losartan-hydrochlorothiazide (HYZAAR) 50-12.5 MG tablet     SUDAFED OR     VICODIN 5-500 MG OR TABS     No current facility-administered medications for this visit.        Allergies   Allergen Reactions     Doxycycline Hyclate [Doxycycline] Anaphylaxis     Latex Rash     Social History     Social History Narrative     Not on file     Family History   Problem Relation Age of Onset     Cancer Maternal Grandfather         luekemia     Diabetes Paternal Grandmother      Cancer Paternal Grandfather          with black lung      ROS: 10 point ROS neg other than the symptoms noted above in the HPI.    PE:  B/P: Data Unavailable, T: Data Unavailable, P: Data Unavailable, R: Data Unavailable  General: well developed, well nourished WF who appears their stated age  HEENT: NC/AT, EOMI, (-)icterus, (-)injection  Neck: Supple, No JVD  Chest: CTA  Heart: S1, S2, (-)m/r/g  Abd: Soft, non tender, non  distended, non tender, no masses  Ext; Warm, no edema, no varicose veins, bilateral ankle spider veins  Psych: AAOx3  Neuro: No focal deficits    Impression/plan:  There is a 52-year-old lady with bilateral ankle spider veins.  She is a CEAP 1.  I discussed the cosmetic nature of them and the patient expressed understanding.  After discussion with the patient the plan at this time is for half session of sclerotherapy.  That will be under separate dictation today.  The procedure, risks, benefits, and alternatives were discussed and the patient agrees to proceed.      Garcia Llanes MD, FACS      Again, thank you for allowing me to participate in the care of your patient.        Sincerely,        Garcia Llanes MD

## 2022-11-09 NOTE — PROGRESS NOTES
Patient purchased one pair(s) of black, thigh high, closed-toe, size 3 compression hose from the clinic today.     Informed patient all compression hose purchases are final.    Zee Thompson RN on 11/9/2022 at 1:32 PM

## 2022-11-09 NOTE — PROGRESS NOTES
[facility] Procedure Note    Dari Sanchez  November 9, 2022    Dari Sanchez is a 52 year old year old female. She presents for Sclerotherapy  .    There were no vitals taken for this visit.    Flowsheet Data 11/9/2022   Side: Bilateral       Sclerotherapy    Date/Time: 11/9/2022 1:55 PM  Performed by: Garcia Llanes MD  Authorized by: Garcia Llanes MD     Time out: Immediately prior to the procedure a time out was called    Preparation: Patient was prepped and draped in usual sterile fashion    Type:  Cosmetic  Session:  Limited  Procedure side:  Bilateral  Solution/Amount:  .5 POLIDOCANOL  Syringes:  3  Patient tolerance:  Patient tolerated the procedure well with no immediate complications        Garcia Llanes MD,FACS

## 2022-11-09 NOTE — PATIENT INSTRUCTIONS
Sclerotherapy: Pre-Treatment Instructions    Recommended Sessions:  ______ treatment sessions    Pricing: Full session - $407                 *Payment is due at the time of visit following the treatment    Time Required per Treatment Session - About 45 minutes  Please come in 15 minutes before your scheduled appointment.  30 min.  Sclerotherapy treatments last approximately 30 minutes.  5 min.    A staff member will wipe your legs off with warm water and dry them with a wash cloth.                 Then you can put your compression hose on, get dressed and check out.  10 min.  After your treatment, you will be asked to walk around for 10 minutes before you get in your car.    Medications  Five days before your appointment, discontinue aspirin (Bufferin, Anacin, etc.) and Ibuprofen (Motrin, Advil, Aleve, etc.) to reduce bruising. Resume these medications the day following the treatment.    Leg Preparation  Do not shave your legs or apply any oil, lotion or powder the night before or the day of your treatment.    Clothing  Shorts:  Bring a pair of loose, comfortable shorts to wear during your treatment (or you can choose to wear ours). Shoes: Bring comfortable shoes to accommodate the compression hose after your treatment. Do not wear flip-flops or thong-style sandals unless you have open-toe compression hose.    Photographs  Photos will be taken before each treatment. This helps monitor your progress.    Injections  The physician will inject your veins with the sclerotherapy solution chosen to meet your specific needs.    Compression Hose  Please bring your compression hose if you have them. They may also be reserved for you at our clinic. Compression hose must be worn immediately after each sclerotherapy treatment.  The hose must be compression level 20-30, and they must be worn for 24 hours straight after your treatment.  If you have never worn compression hose before, a staff member will teach you how to put  them on.             You cannot have a treatment without compression hose.               They are critical to the success of your treatment!    You may purchase your compression hose from us. We will measure you and have the hose available when you come for your treatment.    Cancellation and Rescheduling  If you need to cancel or reschedule your sclerotherapy treatment, please give our office at least 24 hour notice.    Sclerotherapy: Basic Information        What is sclerotherapy?  Sclerotherapy is a treatment for  spider  veins.  Spider  veins are small veins just under your skin that can look red, blue or purple. Most  spider  veins are only a cosmetic problem.  Spider  veins are not useful and treating them will not affect your circulation.    How does sclerotherapy work?  1.  Injections: A very small needle is used to inject a solution into the  spider  veins. The solution irritates the cells that line the vein walls. This causes the veins to collapse. The vein walls to stick together and they can no longer carry blood. Different solutions are used based on the size of the veins.  2.  Compression:  The spider veins are kept collapsed by wearing compression stockings. Your body will break down and absorb the treated veins. You wear the compression hose for 24 hours after the treatment and then for 4 more days during your waking hours only.    How does the body heal after sclerotherapy?  The process is similar to how your body heals after a bad bruise. It takes 4-6 weeks or more for the healing to be complete. When the healing is complete, the vein is no longer visible. It may take more than one treatment.    How do I get the best results?  It is important to follow the post-sclerotherapy instructions. The best results require time and patience. The injection sites will continue to heal and fade for months after a treatment. Please discuss your expectations with your doctor to keep them realistic. Your doctor  will do everything possible to meet or exceed your expectations.    How many treatments are needed?  After your initial exam, your doctor will give you an estimate of the number of treatments that may be required. It depends upon the size, type, and quantity of your  spider  veins and on the doctor's assessment, your history and expectations. You may end up needing fewer or more treatments.    How soon can I have another treatment?  Additional treatments are scheduled every 4-6 weeks to allow time for the body to respond to the previous treatment.    Common Side Effects:  Itching  The areas that were injected may itch. This is usually mild and lasts less than a day. Do not use lotions or creams on your legs until the injection sites have healed over.    Pain  It is common to have some tenderness at the injection sites. Injection of the solution can be uncomfortable, but is usually well tolerated by most patients. The tenderness is temporary, lasting 24 hours at most. Tylenol or Ibuprofen can be used, if needed, following the product directions.    Bruising  This may occur at the injections sites. Bruising may be minimized by avoiding Aspirin and Ibuprofen products for five days before each treatment session.    Hyperpigmentation  A light brown discoloration of the skin may develop along the veins in the areas injected. Approximately 20-30% of patients treated note the discoloration (which is lighter and less obvious than the veins that are being treated). The hyperpigmentation usually fades in a couple of weeks, but may take several months to a year to totally resolve. There is a 1% chance of hyperpigmentation continuing after one year.    Trapped blood  A small amount of blood may become trapped and hardened in the veins. This may feel like a knot or cord and it may look dark blue or bruised. This is a common occurrence. You may need to return before your next treatment so this area can be drained to remove the  trapped blood. This will reduce the hyperpigmentation that can occur. The chance of this occurring can be decreased with proper use of compression hose after your treatment.    Matting  Matting is the formation of new, fine  spider  veins in the area injected.  It occurs in approximately 10% of patients injected. The exact reason for this is unknown. If untreated, the matting usually resolves in 3 to 12 months, but very rarely, it can be permanent. If the matting does not fade, it can be re-injected.    Rare Side Effects:  Ulceration at injection sites  Very rarely, a small ulcer will occur at the site where a vein is injected. An ulcer can take 4 to 6 weeks to completely heal. A small scar may result.    Allergic reactions  There is a very rare incidence of an allergic reaction to the solution injected. You will be observed for such reaction and will be treated appropriately should it occur. Please inform us of any allergy history.    Pulmonary embolus/Deep vein thrombosis  This is a blood clot which moves to the lungs/a blood clot in the deep vein system. There is an extraordinarily low incidence of this complication.      SCLEROTHERAPY AFTER CARE  Immediately:  After treatment, walk for 10-15 minutes before getting in your car.  If your trip home is more than 1 hour, stop and walk around for 5-10 minutes. Avoid sitting or standing for extended periods.   First 24 hours: Wear your compression continuously, even while in bed. After the 24 hours, you may shower if you want to. Put your hose back on, unless you are going to bed. You should NOT wear compression to bed after the first 24 hours. You may fly the next day, but wear your compression.   For 5 days: Wear the compression hose for waking hours only. You may continue to wear them longer than 5 days if you prefer.   For days 5-7: Walking is encouraged, as it promotes efficient circulation in your veins. You may do activities that raise your heart rate, but do  NOT run, jog, do high impact aerobics, or weight lifting. After 7 days, no activity restrictions.  Shaving: Wait a few days to shave or apply lotion.   Bathing: Do NOT take hot baths or sit in a hot tub for 7-10 days.    For 1 year: Wear SPF 30 sunscreen on your legs when in the sun. This is very important! It helps prevent darkening of the skin at the injection sites.   Medications: You may resume your usual medications, including aspirin or ibuprofen.    Common Things to Expect       Compression must be worn for the first 24 hours and then during the day for 5-7 days.    If larger veins are treated with ultrasound-guided sclerotherapy, you will have redness, firmness, tenderness, and swelling.  This firmness and tenderness may take 3-6 months to resolve. Ibuprofen and compression hose will aid in this process.    You will have bruising that can last up to 3 weeks. Most fading of the veins will occur between 3 and 6 weeks after treatment.    You may notice brown discoloration (hyperpigmentation) at the treatment site.  This should fade with time, but will take 3 months to 1 year to fully heal.     Some treated veins may look darker because of trapped blood within the vein. This trapped blood can be removed at a minimum of 1 month following treatment. Larger veins are more likely to develop trapped blood.    It is very important for you to use at least SPF 30 sunscreen in order to help prevent the discoloration of your skin.    Migraines rarely occur following sclerotherapy, but are more likely in patients with a history of migraines.  Treat as you would any other migraine.      [employer] last reviewed this educational content on 11/1/2019 2000-2021 [employer]. All rights reserved. This information is not intended as a substitute for professional medical care. Always follow your healthcare professional's instructions.

## 2023-01-07 ENCOUNTER — HEALTH MAINTENANCE LETTER (OUTPATIENT)
Age: 53
End: 2023-01-07

## 2023-04-22 ENCOUNTER — HEALTH MAINTENANCE LETTER (OUTPATIENT)
Age: 53
End: 2023-04-22

## 2023-11-28 ENCOUNTER — OFFICE VISIT (OUTPATIENT)
Dept: VASCULAR SURGERY | Facility: CLINIC | Age: 53
End: 2023-11-28
Payer: COMMERCIAL

## 2023-11-28 DIAGNOSIS — I83.93 SPIDER VEINS OF BOTH LOWER EXTREMITIES: Primary | ICD-10-CM

## 2023-11-28 PROCEDURE — A6533 GC STOCKING THIGHLNGTH 18-30: HCPCS

## 2023-11-28 PROCEDURE — S9999 SALES TAX: HCPCS | Performed by: SPECIALIST

## 2023-11-28 PROCEDURE — 99207 PR NO CHARGE NURSE ONLY: CPT

## 2023-11-28 PROCEDURE — 36468 NJX SCLRSNT SPIDER VEINS: CPT | Performed by: SPECIALIST

## 2023-11-28 NOTE — LETTER
11/28/2023         RE: Dari Sanchez  [address]  [place] [address]        Dear Colleague,    Thank you for referring your patient, Dari Sanchez, to the [facility] [place]. Please see a copy of my visit note below.        [facility] Procedure Note    Dari Sanchez  November 28, 2023    Dari Sanchez is a 53 year old year old female. She presents for Sclerotherapy  .    There were no vitals taken for this visit.        11/28/2023    10:52 AM   Flowsheet Data   Side: Bilateral       Sclerotherapy    Date/Time: 11/28/2023 11:17 AM    Performed by: Garcia Llanes MD  Authorized by: Garcia Llanes MD    Time out: Immediately prior to the procedure a time out was called    Type:  Cosmetic  Session:  Limited  Procedure side:  Bilateral  Solution/Amount:  .5 POLIDOCANOL  Syringes:  2  Patient tolerance:  Patient tolerated the procedure well with no immediate complications  Wrap/Hose:  Hose      Garcia Llanes MD, FACS      Again, thank you for allowing me to participate in the care of your patient.        Sincerely,        Garcia Llanes MD

## 2023-11-28 NOTE — PROGRESS NOTES
[facility] Procedure Note    Dari Sanchez  November 28, 2023    Dari Sanchez is a 53 year old year old female. She presents for Sclerotherapy  .    There were no vitals taken for this visit.        11/28/2023    10:52 AM   Flowsheet Data   Side: Bilateral       Sclerotherapy    Date/Time: 11/28/2023 11:17 AM    Performed by: Garcia Llanes MD  Authorized by: Garcia Llanes MD    Time out: Immediately prior to the procedure a time out was called    Type:  Cosmetic  Session:  Limited  Procedure side:  Bilateral  Solution/Amount:  .5 POLIDOCANOL  Syringes:  2  Patient tolerance:  Patient tolerated the procedure well with no immediate complications  Wrap/Hose:  Rafael Llanes MD, FACS

## 2023-11-28 NOTE — PROGRESS NOTES
Patient purchased one pair(s) of black, thigh high, closed-toe, size 3 compression hose from the clinic today.     Informed patient all compression hose purchases are final.    Zee Thompson RN on 11/28/2023 at 3:45 PM

## 2024-06-29 ENCOUNTER — HEALTH MAINTENANCE LETTER (OUTPATIENT)
Age: 54
End: 2024-06-29

## 2025-03-30 ENCOUNTER — HEALTH MAINTENANCE LETTER (OUTPATIENT)
Age: 55
End: 2025-03-30

## 2025-07-13 ENCOUNTER — HEALTH MAINTENANCE LETTER (OUTPATIENT)
Age: 55
End: 2025-07-13